# Patient Record
Sex: MALE | Race: OTHER | HISPANIC OR LATINO
[De-identification: names, ages, dates, MRNs, and addresses within clinical notes are randomized per-mention and may not be internally consistent; named-entity substitution may affect disease eponyms.]

---

## 2021-05-25 ENCOUNTER — APPOINTMENT (OUTPATIENT)
Dept: INTERNAL MEDICINE | Facility: CLINIC | Age: 31
End: 2021-05-25
Payer: COMMERCIAL

## 2021-05-25 DIAGNOSIS — G57.02 LESION OF SCIATIC NERVE, LEFT LOWER LIMB: ICD-10-CM

## 2021-05-25 DIAGNOSIS — M79.18 MYALGIA, OTHER SITE: ICD-10-CM

## 2021-05-25 PROCEDURE — 99203 OFFICE O/P NEW LOW 30 MIN: CPT | Mod: 95

## 2021-05-26 PROBLEM — G57.02 PIRIFORMIS SYNDROME OF LEFT SIDE: Status: ACTIVE | Noted: 2021-05-25

## 2021-05-26 NOTE — HISTORY OF PRESENT ILLNESS
[Home] : at home, [unfilled] , at the time of the visit. 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

## 2021-05-26 NOTE — PHYSICAL EXAM
Pharmacy is calling again to say he is not enrolled in Medicaid so unable to refill medication at this time and wants a call from the office to let them know about the refill for the \"nasal spray\".   [Normal] : no acute distress, well nourished, well developed and well-appearing

## 2021-05-26 NOTE — HISTORY OF PRESENT ILLNESS
[Home] : at home, [unfilled] , at the time of the visit. [Medical Office: ([facility])___] : at the medical office located in  [Verbal consent obtained from patient] : the patient, [unfilled] [de-identified] : 31yoM PMH asthma, obesity s/p sleeve gastrectomy, herniated disc, patella injury presents to establish care\par \par works as drive\par off work x 1.5 months\par \par endorses sharp left posterior thigh x 1 month\par sharp pain radiates to lateral thigh and last few seconds\par worse when coughs, laughs, sneezes\par worse when moves from sitting to standing position\par denies leg swelling, mass\par \par has history lumbar radiculopathy in past - required local spine injection